# Patient Record
Sex: FEMALE | Race: OTHER | HISPANIC OR LATINO | URBAN - METROPOLITAN AREA
[De-identification: names, ages, dates, MRNs, and addresses within clinical notes are randomized per-mention and may not be internally consistent; named-entity substitution may affect disease eponyms.]

---

## 2021-04-05 ENCOUNTER — EMERGENCY (EMERGENCY)
Facility: HOSPITAL | Age: 36
LOS: 1 days | Discharge: HOME | End: 2021-04-05
Attending: EMERGENCY MEDICINE
Payer: COMMERCIAL

## 2021-04-05 VITALS
SYSTOLIC BLOOD PRESSURE: 109 MMHG | HEIGHT: 64 IN | RESPIRATION RATE: 18 BRPM | WEIGHT: 270.07 LBS | DIASTOLIC BLOOD PRESSURE: 69 MMHG | TEMPERATURE: 98 F | OXYGEN SATURATION: 97 % | HEART RATE: 67 BPM

## 2021-04-05 PROCEDURE — 99284 EMERGENCY DEPT VISIT MOD MDM: CPT

## 2021-04-05 PROCEDURE — 99283 EMERGENCY DEPT VISIT LOW MDM: CPT

## 2021-04-05 RX ADMIN — Medication 2 TABLET(S): at 18:26

## 2021-04-05 NOTE — ED PROVIDER NOTE - OBJECTIVE STATEMENT
Pt is a 35y F with no PMH presenting for HA/ nausea/vomiting since 5 am this morning. Pt states she woke at 5:30 with HA and had breakfast and then vomited. She states she continued to feel nauseous and while at work went to the nurse who took her BP and gave her tylenol. She states the pain went from a 7/10 to a 4/10. She denies any hx of migraines. Pt states she has a lot of stressor currently. She denies any head trauma/visual changes/ weakness/ fever/chills/neck pain.

## 2021-04-05 NOTE — ED PROVIDER NOTE - CARE PROVIDER_API CALL
Johny Auguste; PhD)  Neurology; Vascular Neurology  [address] [place] [address]  Phone: [phone]  Fax: [phone]  Follow Up Time:

## 2021-04-05 NOTE — ED PROVIDER NOTE - NSFOLLOWUPINSTRUCTIONS_ED_ALL_ED_FT
Follow up with PMD.  Follow up with neuro.  Come back with new or worsening symptoms.     Headache    A headache is pain or discomfort felt around the head or neck area. The specific cause of a headache may not be found as there are many types including tension headaches, migraine headaches, and cluster headaches. Watch your condition for any changes. Things you can do to manage your pain include taking over the counter and prescription medications as instructed by your health care provider, lying down in a dark quiet room, limiting stress, getting regular sleep, and refraining from alcohol and tobacco products.    SEEK IMMEDIATE MEDICAL CARE IF YOU HAVE ANY OF THE FOLLOWING SYMPTOMS: fever, vomiting, stiff neck, loss of vision, problems with speech, muscle weakness, loss of balance, trouble walking, passing out, or confusion.

## 2021-04-05 NOTE — ED PROVIDER NOTE - ATTENDING CONTRIBUTION TO CARE
I, Shay Snow, have personally performed a face to face diagnostic evaluation on this patient. I have reviewed the ACP note and agree with the history, exam and plan of care, except as noted.    34 yo F p/w headache associated with nausea started around 5:30 am. Pain improved after Tylenol at work but still persistent. no hx of HTN. no family hx of aneurysm. no focal neurological deficit on exam. pain improved with Fioricet.

## 2021-04-05 NOTE — ED PROVIDER NOTE - CLINICAL SUMMARY MEDICAL DECISION MAKING FREE TEXT BOX
Pt is a 35y F with no PMH presenting for HA since 5;30 am with nausea and vomiting. Pt denies nausea and vomiting now. No abd ttp. Will medicate and reassess.

## 2021-04-05 NOTE — ED ADULT TRIAGE NOTE - CHIEF COMPLAINT QUOTE
Patient BIBA to ED today with c/o headache that she awoke with at 5am today.  Patient states she went to work and then she started to have dizziness and nausea and work called 911 for her.

## 2021-04-05 NOTE — ED PROVIDER NOTE - PATIENT PORTAL LINK FT
You can access the FollowMyHealth Patient Portal offered by [facility] by registering at the following website: [email or web address]. By joining [employer]’s FollowMyHealth portal, you will also be able to view your health information using other applications (apps) compatible with our system.
